# Patient Record
Sex: FEMALE | Race: WHITE | NOT HISPANIC OR LATINO
[De-identification: names, ages, dates, MRNs, and addresses within clinical notes are randomized per-mention and may not be internally consistent; named-entity substitution may affect disease eponyms.]

---

## 2017-06-14 ENCOUNTER — APPOINTMENT (OUTPATIENT)
Dept: DERMATOLOGY | Facility: CLINIC | Age: 77
End: 2017-06-14

## 2017-11-29 ENCOUNTER — APPOINTMENT (OUTPATIENT)
Dept: DERMATOLOGY | Facility: CLINIC | Age: 77
End: 2017-11-29
Payer: MEDICARE

## 2017-11-29 PROCEDURE — 17003 DESTRUCT PREMALG LES 2-14: CPT

## 2017-11-29 PROCEDURE — 99214 OFFICE O/P EST MOD 30 MIN: CPT | Mod: 25

## 2017-11-29 PROCEDURE — 17000 DESTRUCT PREMALG LESION: CPT

## 2018-05-30 ENCOUNTER — APPOINTMENT (OUTPATIENT)
Dept: DERMATOLOGY | Facility: CLINIC | Age: 78
End: 2018-05-30
Payer: MEDICARE

## 2018-05-30 PROCEDURE — 99213 OFFICE O/P EST LOW 20 MIN: CPT

## 2018-11-30 ENCOUNTER — APPOINTMENT (OUTPATIENT)
Dept: DERMATOLOGY | Facility: CLINIC | Age: 78
End: 2018-11-30
Payer: MEDICARE

## 2018-11-30 PROCEDURE — 99214 OFFICE O/P EST MOD 30 MIN: CPT

## 2019-09-28 ENCOUNTER — APPOINTMENT (OUTPATIENT)
Dept: DERMATOLOGY | Facility: CLINIC | Age: 79
End: 2019-09-28
Payer: MEDICARE

## 2019-09-28 PROCEDURE — 99214 OFFICE O/P EST MOD 30 MIN: CPT

## 2019-10-12 ENCOUNTER — TRANSCRIPTION ENCOUNTER (OUTPATIENT)
Age: 79
End: 2019-10-12

## 2020-07-01 ENCOUNTER — APPOINTMENT (OUTPATIENT)
Dept: DERMATOLOGY | Facility: CLINIC | Age: 80
End: 2020-07-01
Payer: MEDICARE

## 2020-07-01 PROCEDURE — 99214 OFFICE O/P EST MOD 30 MIN: CPT | Mod: 25

## 2020-07-01 PROCEDURE — 17000 DESTRUCT PREMALG LESION: CPT

## 2020-07-01 PROCEDURE — 17003 DESTRUCT PREMALG LES 2-14: CPT

## 2020-11-27 ENCOUNTER — APPOINTMENT (OUTPATIENT)
Dept: DERMATOLOGY | Facility: CLINIC | Age: 80
End: 2020-11-27

## 2020-12-22 ENCOUNTER — RESULT REVIEW (OUTPATIENT)
Age: 80
End: 2020-12-22

## 2021-04-16 ENCOUNTER — APPOINTMENT (OUTPATIENT)
Dept: ORTHOPEDIC SURGERY | Facility: CLINIC | Age: 81
End: 2021-04-16
Payer: MEDICARE

## 2021-04-16 VITALS
HEART RATE: 71 BPM | DIASTOLIC BLOOD PRESSURE: 78 MMHG | SYSTOLIC BLOOD PRESSURE: 161 MMHG | HEIGHT: 63 IN | BODY MASS INDEX: 29.23 KG/M2 | WEIGHT: 165 LBS

## 2021-04-16 DIAGNOSIS — Z85.3 PERSONAL HISTORY OF MALIGNANT NEOPLASM OF BREAST: ICD-10-CM

## 2021-04-16 DIAGNOSIS — Z78.9 OTHER SPECIFIED HEALTH STATUS: ICD-10-CM

## 2021-04-16 DIAGNOSIS — Z87.39 PERSONAL HISTORY OF OTHER DISEASES OF THE MUSCULOSKELETAL SYSTEM AND CONNECTIVE TISSUE: ICD-10-CM

## 2021-04-16 DIAGNOSIS — Z86.79 PERSONAL HISTORY OF OTHER DISEASES OF THE CIRCULATORY SYSTEM: ICD-10-CM

## 2021-04-16 DIAGNOSIS — M76.822 POSTERIOR TIBIAL TENDINITIS, LEFT LEG: ICD-10-CM

## 2021-04-16 DIAGNOSIS — Z86.39 PERSONAL HISTORY OF OTHER ENDOCRINE, NUTRITIONAL AND METABOLIC DISEASE: ICD-10-CM

## 2021-04-16 DIAGNOSIS — M25.561 PAIN IN RIGHT KNEE: ICD-10-CM

## 2021-04-16 DIAGNOSIS — Z80.3 FAMILY HISTORY OF MALIGNANT NEOPLASM OF BREAST: ICD-10-CM

## 2021-04-16 PROCEDURE — 73564 X-RAY EXAM KNEE 4 OR MORE: CPT | Mod: RT

## 2021-04-16 PROCEDURE — 99204 OFFICE O/P NEW MOD 45 MIN: CPT | Mod: 25

## 2021-04-16 PROCEDURE — 20610 DRAIN/INJ JOINT/BURSA W/O US: CPT | Mod: RT

## 2021-04-16 RX ORDER — ATORVASTATIN CALCIUM 10 MG/1
10 TABLET, FILM COATED ORAL
Refills: 0 | Status: ACTIVE | COMMUNITY

## 2021-04-16 RX ORDER — UBIDECARENONE 200 MG
CAPSULE ORAL
Refills: 0 | Status: ACTIVE | COMMUNITY

## 2021-04-16 RX ORDER — AZELASTINE HYDROCHLORIDE 137 UG/1
0.1 SPRAY, METERED NASAL
Refills: 0 | Status: ACTIVE | COMMUNITY

## 2021-04-16 RX ORDER — AMLODIPINE BESYLATE 5 MG/1
5 TABLET ORAL
Refills: 0 | Status: ACTIVE | COMMUNITY

## 2021-04-16 RX ORDER — ANASTROZOLE 1 MG/1
1 TABLET ORAL
Refills: 0 | Status: ACTIVE | COMMUNITY

## 2021-04-16 RX ORDER — OLMESARTAN MEDOXOMIL, AMLODIPINE BESYLATE AND HYDROCHLOROTHIAZIDE 40; 10; 12.5 MG/1; MG/1; MG/1
40-5-25 TABLET, FILM COATED ORAL
Refills: 0 | Status: ACTIVE | COMMUNITY

## 2021-04-16 RX ORDER — METOPROLOL TARTRATE 100 MG/1
100 TABLET, FILM COATED ORAL
Refills: 0 | Status: ACTIVE | COMMUNITY

## 2021-04-16 RX ORDER — ASPIRIN ENTERIC COATED TABLETS 81 MG 81 MG/1
81 TABLET, DELAYED RELEASE ORAL
Refills: 0 | Status: ACTIVE | COMMUNITY

## 2021-04-16 RX ORDER — MELOXICAM 15 MG/1
15 TABLET ORAL
Refills: 0 | Status: ACTIVE | COMMUNITY

## 2021-04-16 NOTE — ADDENDUM
[FreeTextEntry1] : This note was authored by Sanchez Skelton working as a medical scribe for Dr. Navin Ramos. The note was reviewed, edited, and revised by Dr. Navin Ramos whom is in agreement with the exam findings, imaging findings, and treatment plan. 04/16/2021.

## 2021-04-16 NOTE — HISTORY OF PRESENT ILLNESS
[de-identified] : Patient is an 80-year-old female presenting for evaluation of longstanding history of right knee pain.  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 Fragoso.  Patient's right knee pain is generalized to the entire knee and is worse with weightbearing today including walking long distance, rising from seated position, and standing for any amount of time.  She feels that the knee carlos from time to time as well.  Patient also admits to intermittent swelling of the right knee.  She has been diagnosed with right knee osteoarthritis and treated conservatively thus far.  She received a steroid injection approximately 1 year ago at that time. She has tried PT and home exercise without significant relief.  Patient takes Tylenol for pain with minimal relief.  She is fearful of another surgery and presents today hopeful for repeat injections.

## 2021-04-16 NOTE — PROCEDURE
[de-identified] : Using sterile technique, 2cc of depomedrol 40mg/ml, 4cc of 1% plain lidocaine, and 2 cc 0.25% marcaine was drawn up into a sterile syringe. The right knee was then sterilely prepped with chlorhexidine. Ethyl chloride spray was used to anesthetize the skin and subQ tissue. The depomedrol/lidocaine/marcaine mixture was then injected into the knee joint in the anterolateral position. The patient tolerated the procedure well without difficulty. The patient was given instructions on the use of ice and anti-inflammatories post injection site soreness.\par

## 2021-04-16 NOTE — DISCUSSION/SUMMARY
[de-identified] : The patient is a 80 year old female with bone on bone valgus arthritis of the right knee.  Because of a less than optimal outcome of her left knee many years ago, she is hesitant to undergo right knee replacement.  However she has significant deformity with a valgus thrust and hyperextension when walking which is making it very difficult for her to ambulate.  I am concerned that if she postpones this much longer that she may end up with minimal ambulatory capacity.  She is currently healthy enough to undergo surgery and we discussed the option of surgery in great detail.  I do not think injections are sustainable option for her.  She was given a cortisone injection in the right knee today upon her request.  He is going to consider knee replacement and discuss it further with her  and follow-up with us in 6 weeks.\par \par A discussion was had with the patient regarding a right total knee replacement. A long discussion was had with the patient as what the total joint replacement would entail. A model was used to demonstrate the operation and to discuss bearing surfaces of the implants. The hospitalization and rehabilitation were discussed.  The use of perioperative antibiotics and DVT prophylaxis were discussed. The risks, benefits and alternatives to surgical intervention were discussed at length with the patient. Specific risks discussed included: infection, wound breakdown, numbness and damage to nerves, tendon, muscle, arteries or other blood vessels. The possibility of recurrent pain, no improvement in pain and actual worsening of the pain were also mentioned in conversation with the patient. Medical complications related to the patient's general medical health including deep vein thrombosis, pulmonary embolus, heart attack, stroke, death and other complications from anesthesia were discussed as well. The patient was told that we will take steps to minimize these risks by using sterile technique, antibiotics and DVT prophylaxis when appropriate and following the patient postoperatively in the clinic setting to monitor progress. The benefits of surgery were discussed with the patient including the potential to improve the current clinical condition through operative intervention. Alternatives to surgical intervention include continued conservative management which may yield less than optimal results in this particular patient. All questions were answered to the satisfaction of the patient.

## 2021-04-16 NOTE — PHYSICAL EXAM
[de-identified] : The patient appears well nourished  and in no apparent distress.  The patient is alert and oriented to person, place, and time.   Affect and mood appear normal. The head is normocephalic and atraumatic.  The eyes reveal normal sclera and extra ocular muscles are intact. The tongue is midline with no apparent lesions.  Skin shows normal turgor with no evidence of eczema or psoriasis.  No respiratory distress noted.  Sensation grossly intact.\par   [de-identified] : Exam of the right knee shows a small effusion, 5 degrees of hyperextension, 13 degrees of valgus which is partially correctable, 3-5 mm laxity of the MCL, flexion of 116 degrees measured with a goniometer. During gait the knee hyperextends with a valgus thrust. 5/5 motor strength bilaterally distally. Sensation intact distally.  [de-identified] : Xray- 4 views of the right knee shows bone on bone valgus arthritis of the right knee.

## 2021-04-16 NOTE — CONSULT LETTER
[Dear  ___] : Dear  [unfilled], [Consult Letter:] : I had the pleasure of evaluating your patient, [unfilled]. [Please see my note below.] : Please see my note below. [Consult Closing:] : Thank you very much for allowing me to participate in the care of this patient.  If you have any questions, please do not hesitate to contact me. [Sincerely,] : Sincerely, [FreeTextEntry2] : NANCY BROTHERS MD\par  [FreeTextEntry3] : Navin Ramos MD\par Chief of Joint Replacement\par [facility] \par [facility]\par \par

## 2021-05-19 ENCOUNTER — APPOINTMENT (OUTPATIENT)
Dept: DERMATOLOGY | Facility: CLINIC | Age: 81
End: 2021-05-19
Payer: MEDICARE

## 2021-05-19 PROCEDURE — 99213 OFFICE O/P EST LOW 20 MIN: CPT

## 2021-06-03 ENCOUNTER — APPOINTMENT (OUTPATIENT)
Dept: ORTHOPEDIC SURGERY | Facility: CLINIC | Age: 81
End: 2021-06-03
Payer: MEDICARE

## 2021-06-03 VITALS
BODY MASS INDEX: 28.17 KG/M2 | WEIGHT: 159 LBS | DIASTOLIC BLOOD PRESSURE: 80 MMHG | SYSTOLIC BLOOD PRESSURE: 174 MMHG | HEART RATE: 66 BPM | HEIGHT: 63 IN

## 2021-06-03 PROCEDURE — 99213 OFFICE O/P EST LOW 20 MIN: CPT

## 2021-06-03 NOTE — HISTORY OF PRESENT ILLNESS
[de-identified] : Patient is an 80-year-old female presenting for follow up evaluation of longstanding history of right knee pain.  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 Fragoso.  Patient's right knee pain is generalized to the entire knee and is worse with weightbearing today including walking long distance, rising from seated position, and standing for any amount of time.  She feels that the knee carlos from time to time as well.  Patient also admits to intermittent swelling of the right knee.  She has been diagnosed with right knee osteoarthritis and treated conservatively thus far.  She has tried PT and home exercise without significant relief.  Patient takes Tylenol for pain with minimal relief. Patient was seen with the same issues 6 weeks ago and received a depo medrol injection to the right knee. She notes significant improvement with this.  She is fearful of another surgery and presents today to discuss future plans.

## 2021-06-03 NOTE — PHYSICAL EXAM
[de-identified] : The patient appears well nourished  and in no apparent distress.  The patient is alert and oriented to person, place, and time.   Affect and mood appear normal. The head is normocephalic and atraumatic.  The eyes reveal normal sclera and extra ocular muscles are intact. The tongue is midline with no apparent lesions.  Skin shows normal turgor with no evidence of eczema or psoriasis.  No respiratory distress noted.  Sensation grossly intact.  [de-identified] : Exam of the right knee shows 5 degrees of varus which is partially correctable, 3 mm laxity of the LCL, 5 degrees of hyperextension, 125 degrees of flexion measured with a goniometer. 5/5 motor strength bilaterally distally. Sensation intact distally. There is no effusion. [de-identified] : Prior X-ray (4/16/21): 4 views of the right knee demonstrate bone on bone valgus arthritis.

## 2021-06-03 NOTE — DISCUSSION/SUMMARY
[de-identified] : The patient is an 80 year old female who presents with bone on bone arthritis of her right knee. I believe she will ultimately benefit from a knee replacement, however patient would like to continue nonoperative treatment. Prescription provided for physical therapy. We discussed other treatment options including hyaluronic acid injections. Patient will follow up to schedule injections.

## 2021-11-12 ENCOUNTER — APPOINTMENT (OUTPATIENT)
Dept: ORTHOPEDIC SURGERY | Facility: CLINIC | Age: 81
End: 2021-11-12
Payer: MEDICARE

## 2021-11-12 VITALS
SYSTOLIC BLOOD PRESSURE: 137 MMHG | DIASTOLIC BLOOD PRESSURE: 81 MMHG | HEART RATE: 65 BPM | BODY MASS INDEX: 28.17 KG/M2 | HEIGHT: 63 IN | WEIGHT: 159 LBS

## 2021-11-12 PROCEDURE — 99214 OFFICE O/P EST MOD 30 MIN: CPT | Mod: 25

## 2021-11-12 PROCEDURE — 20610 DRAIN/INJ JOINT/BURSA W/O US: CPT | Mod: RT

## 2021-11-12 PROCEDURE — 73564 X-RAY EXAM KNEE 4 OR MORE: CPT | Mod: RT

## 2021-11-12 NOTE — DISCUSSION/SUMMARY
[de-identified] : The patient is an 81 year old female who presents with bone on bone arthritis of her right knee. I believe she will ultimately benefit from a knee replacement, however patient would like to continue nonoperative treatment. Prescription provided for physical therapy. Patient received a depo Medrol injection to the right knee using sterile technique and tolerated well. She will ice and elevate when home. Follow up in 6-8 weeks.

## 2021-11-12 NOTE — HISTORY OF PRESENT ILLNESS
[de-identified] : Patient is an 80-year-old female presenting for follow up evaluation of longstanding history of right knee pain.  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 Fragoso.  Patient's right knee pain is generalized to the entire knee and is worse with weightbearing today including walking long distance, rising from seated position, and standing for any amount of time.  She feels that the knee carlos from time to time as well.  Patient also admits to intermittent swelling of the right knee.  She has been diagnosed with right knee osteoarthritis and treated conservatively thus far.  She has tried PT and home exercise without significant relief.  Patient takes Tylenol for pain with minimal relief. Patient received a depo medrol injection to the right knee in April of 2021 with good relief at the time. She now states that pain has returned and she is interested in further injections.

## 2021-11-12 NOTE — PROCEDURE
[de-identified] : Using sterile technique, 2cc of depomedrol 40mg/ml, 4cc of 1% plain lidocaine, and 2 cc 0.25% marcaine was drawn up into a sterile syringe. The right knee was then sterilely prepped with chlorhexidine. Ethyl chloride spray was used to anesthetize the skin and subQ tissue. The depomedrol/lidocaine/marcaine mixture was then injected into the knee joint in the anterolateral position. The patient tolerated the procedure well without difficulty. The patient was given instructions on the use of ice and anti-inflammatories post injection site soreness.

## 2021-11-12 NOTE — PHYSICAL EXAM
[de-identified] : The patient appears well nourished  and in no apparent distress.  The patient is alert and oriented to person, place, and time.   Affect and mood appear normal. The head is normocephalic and atraumatic.  The eyes reveal normal sclera and extra ocular muscles are intact. The tongue is midline with no apparent lesions.  Skin shows normal turgor with no evidence of eczema or psoriasis.  No respiratory distress noted.  Sensation grossly intact.  [de-identified] : Exam of the right knee shows 5 degrees of varus which is partially correctable, 3 mm laxity of the LCL, 5 degrees of hyperextension, 125 degrees of flexion measured with a goniometer. 5/5 motor strength bilaterally distally. Sensation intact distally. There is no effusion. [de-identified] : X-ray: 4 views of the right knee demonstrate bone on bone valgus arthritis.

## 2022-05-18 ENCOUNTER — APPOINTMENT (OUTPATIENT)
Dept: DERMATOLOGY | Facility: CLINIC | Age: 82
End: 2022-05-18
Payer: MEDICARE

## 2022-05-18 PROCEDURE — 99213 OFFICE O/P EST LOW 20 MIN: CPT | Mod: 25

## 2022-05-18 PROCEDURE — 17000 DESTRUCT PREMALG LESION: CPT

## 2022-05-18 PROCEDURE — 17003 DESTRUCT PREMALG LES 2-14: CPT

## 2022-05-26 ENCOUNTER — APPOINTMENT (OUTPATIENT)
Dept: ORTHOPEDIC SURGERY | Facility: CLINIC | Age: 82
End: 2022-05-26
Payer: MEDICARE

## 2022-05-26 VITALS
SYSTOLIC BLOOD PRESSURE: 208 MMHG | DIASTOLIC BLOOD PRESSURE: 94 MMHG | BODY MASS INDEX: 28.17 KG/M2 | WEIGHT: 159 LBS | HEIGHT: 63 IN | HEART RATE: 64 BPM

## 2022-05-26 DIAGNOSIS — M17.11 UNILATERAL PRIMARY OSTEOARTHRITIS, RIGHT KNEE: ICD-10-CM

## 2022-05-26 PROCEDURE — 73564 X-RAY EXAM KNEE 4 OR MORE: CPT | Mod: RT

## 2022-05-26 PROCEDURE — 99215 OFFICE O/P EST HI 40 MIN: CPT

## 2022-05-26 NOTE — DISCUSSION/SUMMARY
[de-identified] : GABRIEL FOWLER is a 81 year female who presents with bone on bone valgus arthritis of the right knee. Based upon the patients continued symptoms and failure to respond to conservative treatment I have recommended a right total knee replacement for the patient.  A discussion was had with the patient regarding a right total knee replacement. A long discussion was had with the patient as what the total joint replacement would entail. A model was used to demonstrate the operation and to discuss bearing surfaces of the implants. The hospitalization and rehabilitation were discussed.  The use of perioperative antibiotics and DVT prophylaxis were discussed. The risks, benefits and alternatives to surgical intervention were discussed at length with the patient. Specific risks discussed included: infection, wound breakdown, numbness and damage to nerves, tendon, muscle, arteries or other blood vessels. The possibility of recurrent pain, no improvement in pain and actual worsening of the pain were also mentioned in conversation with the patient. Medical complications related to the patient's general medical health including deep vein thrombosis, pulmonary embolus, heart attack, stroke, death and other complications from anesthesia were discussed as well. The patient was told that we will take steps to minimize these risks by using sterile technique, antibiotics and DVT prophylaxis when appropriate and following the patient postoperatively in the clinic setting to monitor progress. The benefits of surgery were discussed with the patient including the potential to improve the current clinical condition through operative intervention. Alternatives to surgical intervention include continued conservative management which may yield less than optimal results in this particular patient. All questions were answered to the satisfaction of the patient. Models were used as an educational tool. We did discuss implant choices and fixation, with shared decision making with the patient.		 \par \par We discussed that the knee replacement will be done with robotic assistance to enhance accuracy and dynamic joint balancing.

## 2022-05-26 NOTE — PHYSICAL EXAM
[de-identified] : The patient appears well nourished  and in no apparent distress.  The patient is alert and oriented to person, place, and time.   Affect and mood appear normal. The head is normocephalic and atraumatic.  The eyes reveal normal sclera and extra ocular muscles are intact. The tongue is midline with no apparent lesions.  Skin shows normal turgor with no evidence of eczema or psoriasis.  No respiratory distress noted.  Sensation grossly intact.		  [de-identified] : Exam of the right knee shows 10 degrees of valgus which is partially correctable, 3 mm laxity of the MCL, 0 to 125 degrees of flexion measured with a goniometer. \par \par   5/5 motor strength bilaterally distally. Sensation intact distally.		  [de-identified] : X-ray: 4 views of the right knee demonstrate bone on bone valgus arthritis.

## 2022-05-26 NOTE — ADDENDUM
[FreeTextEntry1] : This note was authored by Jad Gale working as a medical scribe for Dr. Navin Ramos. The note was reviewed, edited, and revised by Dr. Navin Ramos whom is in agreement with the exam findings, imaging findings, and treatment plan. 05/26/2022

## 2022-05-26 NOTE — HISTORY OF PRESENT ILLNESS
[de-identified] : Patient is an 80-year-old female presenting for follow up evaluation of longstanding history of right knee osteoarthritis. 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 Fragoso.  Patient's right knee pain is generalized to the entire knee and is worse with weightbearing today including walking long distance, rising from seated position, and standing for any amount of time.  She feels that the knee carlos from time to time as well.  Patient also admits to intermittent swelling of the right knee.  She has been diagnosed with right knee osteoarthritis and treated conservatively thus far.  She has tried PT and home exercise without significant relief.  Patient takes Tylenol for pain with minimal relief. Patient received a depo Medrol injection to the right knee in February of 2022 without improvement. She now states that pain has returned and she is interested in further injections.

## 2022-07-09 ENCOUNTER — APPOINTMENT (OUTPATIENT)
Dept: CT IMAGING | Facility: CLINIC | Age: 82
End: 2022-07-09

## 2022-07-09 ENCOUNTER — OUTPATIENT (OUTPATIENT)
Dept: OUTPATIENT SERVICES | Facility: HOSPITAL | Age: 82
LOS: 1 days | End: 2022-07-09

## 2022-07-09 DIAGNOSIS — M17.11 UNILATERAL PRIMARY OSTEOARTHRITIS, RIGHT KNEE: ICD-10-CM

## 2022-07-09 PROCEDURE — 73700 CT LOWER EXTREMITY W/O DYE: CPT | Mod: 26,RT

## 2022-07-14 ENCOUNTER — OUTPATIENT (OUTPATIENT)
Dept: OUTPATIENT SERVICES | Facility: HOSPITAL | Age: 82
LOS: 1 days | End: 2022-07-14
Payer: MEDICARE

## 2022-07-14 VITALS
WEIGHT: 172.4 LBS | OXYGEN SATURATION: 98 % | RESPIRATION RATE: 20 BRPM | TEMPERATURE: 97 F | DIASTOLIC BLOOD PRESSURE: 96 MMHG | HEART RATE: 80 BPM | SYSTOLIC BLOOD PRESSURE: 178 MMHG | HEIGHT: 62 IN

## 2022-07-14 DIAGNOSIS — Z98.890 OTHER SPECIFIED POSTPROCEDURAL STATES: Chronic | ICD-10-CM

## 2022-07-14 DIAGNOSIS — Z96.652 PRESENCE OF LEFT ARTIFICIAL KNEE JOINT: Chronic | ICD-10-CM

## 2022-07-14 DIAGNOSIS — Z29.9 ENCOUNTER FOR PROPHYLACTIC MEASURES, UNSPECIFIED: ICD-10-CM

## 2022-07-14 DIAGNOSIS — Z01.818 ENCOUNTER FOR OTHER PREPROCEDURAL EXAMINATION: ICD-10-CM

## 2022-07-14 DIAGNOSIS — M17.11 UNILATERAL PRIMARY OSTEOARTHRITIS, RIGHT KNEE: ICD-10-CM

## 2022-07-14 LAB
A1C WITH ESTIMATED AVERAGE GLUCOSE RESULT: 5.6 % — SIGNIFICANT CHANGE UP (ref 4–5.6)
ALBUMIN SERPL ELPH-MCNC: 4.1 G/DL — SIGNIFICANT CHANGE UP (ref 3.3–5.2)
ALP SERPL-CCNC: 59 U/L — SIGNIFICANT CHANGE UP (ref 40–120)
ALT FLD-CCNC: 18 U/L — SIGNIFICANT CHANGE UP
ANION GAP SERPL CALC-SCNC: 14 MMOL/L — SIGNIFICANT CHANGE UP (ref 5–17)
APTT BLD: 27.4 SEC — LOW (ref 27.5–35.5)
AST SERPL-CCNC: 26 U/L — SIGNIFICANT CHANGE UP
BASOPHILS # BLD AUTO: 0.04 K/UL — SIGNIFICANT CHANGE UP (ref 0–0.2)
BASOPHILS NFR BLD AUTO: 0.6 % — SIGNIFICANT CHANGE UP (ref 0–2)
BILIRUB SERPL-MCNC: 0.5 MG/DL — SIGNIFICANT CHANGE UP (ref 0.4–2)
BLD GP AB SCN SERPL QL: SIGNIFICANT CHANGE UP
BUN SERPL-MCNC: 27.8 MG/DL — HIGH (ref 8–20)
CALCIUM SERPL-MCNC: 9 MG/DL — SIGNIFICANT CHANGE UP (ref 8.6–10.2)
CHLORIDE SERPL-SCNC: 103 MMOL/L — SIGNIFICANT CHANGE UP (ref 98–107)
CO2 SERPL-SCNC: 26 MMOL/L — SIGNIFICANT CHANGE UP (ref 22–29)
CREAT SERPL-MCNC: 1.01 MG/DL — SIGNIFICANT CHANGE UP (ref 0.5–1.3)
EGFR: 56 ML/MIN/1.73M2 — LOW
EOSINOPHIL # BLD AUTO: 0.28 K/UL — SIGNIFICANT CHANGE UP (ref 0–0.5)
EOSINOPHIL NFR BLD AUTO: 4.5 % — SIGNIFICANT CHANGE UP (ref 0–6)
ESTIMATED AVERAGE GLUCOSE: 114 MG/DL — SIGNIFICANT CHANGE UP (ref 68–114)
GLUCOSE SERPL-MCNC: 92 MG/DL — SIGNIFICANT CHANGE UP (ref 70–99)
HCT VFR BLD CALC: 38.9 % — SIGNIFICANT CHANGE UP (ref 34.5–45)
HGB BLD-MCNC: 13.2 G/DL — SIGNIFICANT CHANGE UP (ref 11.5–15.5)
IMM GRANULOCYTES NFR BLD AUTO: 0.3 % — SIGNIFICANT CHANGE UP (ref 0–1.5)
INR BLD: 1.08 RATIO — SIGNIFICANT CHANGE UP (ref 0.88–1.16)
LYMPHOCYTES # BLD AUTO: 2.08 K/UL — SIGNIFICANT CHANGE UP (ref 1–3.3)
LYMPHOCYTES # BLD AUTO: 33.3 % — SIGNIFICANT CHANGE UP (ref 13–44)
MCHC RBC-ENTMCNC: 32.2 PG — SIGNIFICANT CHANGE UP (ref 27–34)
MCHC RBC-ENTMCNC: 33.9 GM/DL — SIGNIFICANT CHANGE UP (ref 32–36)
MCV RBC AUTO: 94.9 FL — SIGNIFICANT CHANGE UP (ref 80–100)
MONOCYTES # BLD AUTO: 0.85 K/UL — SIGNIFICANT CHANGE UP (ref 0–0.9)
MONOCYTES NFR BLD AUTO: 13.6 % — SIGNIFICANT CHANGE UP (ref 2–14)
MRSA PCR RESULT.: SIGNIFICANT CHANGE UP
NEUTROPHILS # BLD AUTO: 2.97 K/UL — SIGNIFICANT CHANGE UP (ref 1.8–7.4)
NEUTROPHILS NFR BLD AUTO: 47.7 % — SIGNIFICANT CHANGE UP (ref 43–77)
PLATELET # BLD AUTO: 188 K/UL — SIGNIFICANT CHANGE UP (ref 150–400)
POTASSIUM SERPL-MCNC: 4.5 MMOL/L — SIGNIFICANT CHANGE UP (ref 3.5–5.3)
POTASSIUM SERPL-SCNC: 4.5 MMOL/L — SIGNIFICANT CHANGE UP (ref 3.5–5.3)
PROT SERPL-MCNC: 6.9 G/DL — SIGNIFICANT CHANGE UP (ref 6.6–8.7)
PROTHROM AB SERPL-ACNC: 12.5 SEC — SIGNIFICANT CHANGE UP (ref 10.5–13.4)
RBC # BLD: 4.1 M/UL — SIGNIFICANT CHANGE UP (ref 3.8–5.2)
RBC # FLD: 12.7 % — SIGNIFICANT CHANGE UP (ref 10.3–14.5)
S AUREUS DNA NOSE QL NAA+PROBE: SIGNIFICANT CHANGE UP
SODIUM SERPL-SCNC: 142 MMOL/L — SIGNIFICANT CHANGE UP (ref 135–145)
WBC # BLD: 6.24 K/UL — SIGNIFICANT CHANGE UP (ref 3.8–10.5)
WBC # FLD AUTO: 6.24 K/UL — SIGNIFICANT CHANGE UP (ref 3.8–10.5)

## 2022-07-14 PROCEDURE — 93005 ELECTROCARDIOGRAM TRACING: CPT

## 2022-07-14 PROCEDURE — G0463: CPT

## 2022-07-14 PROCEDURE — 93010 ELECTROCARDIOGRAM REPORT: CPT

## 2022-07-14 NOTE — H&P PST ADULT - NEUROLOGICAL
details… normal/cranial nerves II-XII intact/sensation intact/responds to verbal commands/no spontaneous movement normal/cranial nerves II-XII intact/sensation intact/responds to verbal commands/cranial nerves intact/no spontaneous movement

## 2022-07-14 NOTE — H&P PST ADULT - PROBLEM SELECTOR PLAN 1
caprini score 9, high risk for dvt, SCD ordered, surgical team to assess for dvt prophylaxis caprini score 12, high risk for dvt, SCD ordered, surgical team to assess for dvt prophylaxis

## 2022-07-14 NOTE — H&P PST ADULT - ASSESSMENT
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 Preop assessment prior to right TKR w/Dr Ramos scheduled for 2022    Patient was given information on joint class, joint book provided, ERP protocol reviewed with patient, MSSA/MRSA swabbed in PST, results pending     OPIOID RISK TOOL    ARTEM EACH BOX THAT APPLIES AND ADD TOTALS AT THE END    FAMILY HISTORY OF SUBSTANCE ABUSE                 FEMALE         MALE                                                Alcohol                             [  ]1 pt          [  ]3pts                                               Illegal Durgs                     [  ]2 pts        [  ]3pts                                               Rx Drugs                           [  ]4 pts        [  ]4 pts    PERSONAL HISTORY OF SUBSTANCE ABUSE                                                                                          Alcohol                             [  ]3 pts       [  ]3 pts                                               Illegal Drugs                     [  ]4 pts        [  ]4 pts                                               Rx Drugs                           [  ]5 pts        [  ]5 pts    AGE BETWEEN 16-45 YEARS                                      [  ]1 pt         [  ]1 pt    HISTORY OF PREADOLESCENT   SEXUAL ABUSE                                                             [  ]3 pts        [  ]0pts    PSYCHOLOGICAL DISEASE                     ADD, OCD, Bipolar, Schizophrenia        [  ]2 pts         [  ]2 pts                      Depression                                               [  ]1 pt           [  ]1 pt           SCORING TOTAL   (add numbers and type here)              ( 0 )                                     A score of 3 or lower indicated LOW risk for future opioid abuse  A score of 4 to 7 indicated moderate risk for future opioid abuse  A score of 8 or higher indicates a high risk for opioid abuse    CAPRINI VTE 2.0 SCORE [CLOT updated 2019]    AGE RELATED RISK FACTORS                                                       MOBILITY RELATED FACTORS  [ ] Age 41-60 years                                            (1 Point)                    [ ] Bed rest                                                        (1 Point)  [ ] Age: 61-74 years                                           (2 Points)                  [ ] Plaster cast                                                   (2 Points)  [x ] Age= 75 years                                              (3 Points)                    [ ] Bed bound for more than 72 hours                 (2 Points)    DISEASE RELATED RISK FACTORS                                               GENDER SPECIFIC FACTORS  [ ] Edema in the lower extremities                       (1 Point)              [ ] Pregnancy                                                     (1 Point)  [ ] Varicose veins                                               (1 Point)                     [ ] Post-partum < 6 weeks                                   (1 Point)             [x ] BMI > 25 Kg/m2                                            (1 Point)                     [ ] Hormonal therapy  or oral contraception          (1 Point)                 [ ] Sepsis (in the previous month)                        (1 Point)               [ ] History of pregnancy complications                 (1 point)  [ ] Pneumonia or serious lung disease                                               [ ] Unexplained or recurrent                     (1 Point)           (in the previous month)                               (1 Point)  [ ] Abnormal pulmonary function test                     (1 Point)                 SURGERY RELATED RISK FACTORS  [ ] Acute myocardial infarction                              (1 Point)               [ ]  Section                                             (1 Point)  [ ] Congestive heart failure (in the previous month)  (1 Point)      [ ] Minor surgery                                                  (1 Point)   [ ] Inflammatory bowel disease                             (1 Point)               [ ] Arthroscopic surgery                                        (2 Points)  [ ] Central venous access                                      (2 Points)                [ ] General surgery lasting more than 45 minutes (2 points)  [ ] Malignancy- Present or previous                   (2 Points)                [x ] Elective arthroplasty                                         (5 points)    [ ] Stroke (in the previous month)                          (5 Points)                                                                                                                                                           HEMATOLOGY RELATED FACTORS                                                 TRAUMA RELATED RISK FACTORS  [ ] Prior episodes of VTE                                     (3 Points)                [ ] Fracture of the hip, pelvis, or leg                       (5 Points)  [ ] Positive family history for VTE                         (3 Points)             [ ] Acute spinal cord injury (in the previous month)  (5 Points)  [ ] Prothrombin 87205 A                                     (3 Points)               [ ] Paralysis  (less than 1 month)                             (5 Points)  [ ] Factor V Leiden                                             (3 Points)                  [ ] Multiple Trauma within 1 month                        (5 Points)  [ ] Lupus anticoagulants                                     (3 Points)                                                           [ ] Anticardiolipin antibodies                               (3 Points)                                                       [ ] High homocysteine in the blood                      (3 Points)                                             [ ] Other congenital or acquired thrombophilia      (3 Points)                                                [ ] Heparin induced thrombocytopenia                  (3 Points)                                     Total Score [     9     ] 83 yo F with 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 Preop assessment prior to right TKR w/Dr Ramos scheduled for 2022    Pt was educated on preop preparation with written and verbal instructions. Pt was informed to obtain clearances >3 days before surgery. Pt was educated on NSAIDs, multivitamins and herbals that increase the risk of bleeding and need to be stopped 7 days before procedure. Pt was educated on covid testing and covid prevention, i.e. social distancing, handwashing, mask wearing. Pt verbalized understanding of the above.     Patient was given information on joint class, joint book provided, ERP protocol reviewed with patient, MSSA/MRSA swabbed in PST, results pending     OPIOID RISK TOOL    ARTEM EACH BOX THAT APPLIES AND ADD TOTALS AT THE END    FAMILY HISTORY OF SUBSTANCE ABUSE                 FEMALE         MALE                                                Alcohol                             [  ]1 pt          [  ]3pts                                               Illegal Durgs                     [  ]2 pts        [  ]3pts                                               Rx Drugs                           [  ]4 pts        [  ]4 pts    PERSONAL HISTORY OF SUBSTANCE ABUSE                                                                                          Alcohol                             [  ]3 pts       [  ]3 pts                                               Illegal Drugs                     [  ]4 pts        [  ]4 pts                                               Rx Drugs                           [  ]5 pts        [  ]5 pts    AGE BETWEEN 16-45 YEARS                                      [  ]1 pt         [  ]1 pt    HISTORY OF PREADOLESCENT   SEXUAL ABUSE                                                             [  ]3 pts        [  ]0pts    PSYCHOLOGICAL DISEASE                     ADD, OCD, Bipolar, Schizophrenia        [  ]2 pts         [  ]2 pts                      Depression                                               [  ]1 pt           [  ]1 pt           SCORING TOTAL   (add numbers and type here)              ( 0 )                                     A score of 3 or lower indicated LOW risk for future opioid abuse  A score of 4 to 7 indicated moderate risk for future opioid abuse  A score of 8 or higher indicates a high risk for opioid abuse    CAPRINI VTE 2.0 SCORE [CLOT updated 2019]    AGE RELATED RISK FACTORS                                                       MOBILITY RELATED FACTORS  [ ] Age 41-60 years                                            (1 Point)                    [ ] Bed rest                                                        (1 Point)  [ ] Age: 61-74 years                                           (2 Points)                  [ ] Plaster cast                                                   (2 Points)  [x ] Age= 75 years                                              (3 Points)                    [ ] Bed bound for more than 72 hours                 (2 Points)    DISEASE RELATED RISK FACTORS                                               GENDER SPECIFIC FACTORS  [ x] Edema in the lower extremities                       (1 Point)              [ ] Pregnancy                                                     (1 Point)  [ ] Varicose veins                                               (1 Point)                     [ ] Post-partum < 6 weeks                                   (1 Point)             [x ] BMI > 25 Kg/m2                                            (1 Point)                     [ ] Hormonal therapy  or oral contraception          (1 Point)                 [ ] Sepsis (in the previous month)                        (1 Point)               [ ] History of pregnancy complications                 (1 point)  [ ] Pneumonia or serious lung disease                                               [ ] Unexplained or recurrent                     (1 Point)           (in the previous month)                               (1 Point)  [ ] Abnormal pulmonary function test                     (1 Point)                 SURGERY RELATED RISK FACTORS  [ ] Acute myocardial infarction                              (1 Point)               [ ]  Section                                             (1 Point)  [ ] Congestive heart failure (in the previous month)  (1 Point)      [ ] Minor surgery                                                  (1 Point)   [ ] Inflammatory bowel disease                             (1 Point)               [ ] Arthroscopic surgery                                        (2 Points)  [ ] Central venous access                                      (2 Points)                [ ] General surgery lasting more than 45 minutes (2 points)  [x ] Malignancy- Present or previous                   (2 Points)                [x ] Elective arthroplasty                                         (5 points)    [ ] Stroke (in the previous month)                          (5 Points)                                                                                                                                                           HEMATOLOGY RELATED FACTORS                                                 TRAUMA RELATED RISK FACTORS  [ ] Prior episodes of VTE                                     (3 Points)                [ ] Fracture of the hip, pelvis, or leg                       (5 Points)  [ ] Positive family history for VTE                         (3 Points)             [ ] Acute spinal cord injury (in the previous month)  (5 Points)  [ ] Prothrombin 19234 A                                     (3 Points)               [ ] Paralysis  (less than 1 month)                             (5 Points)  [ ] Factor V Leiden                                             (3 Points)                  [ ] Multiple Trauma within 1 month                        (5 Points)  [ ] Lupus anticoagulants                                     (3 Points)                                                           [ ] Anticardiolipin antibodies                               (3 Points)                                                       [ ] High homocysteine in the blood                      (3 Points)                                             [ ] Other congenital or acquired thrombophilia      (3 Points)                                                [ ] Heparin induced thrombocytopenia                  (3 Points)                                     Total Score [     12    ]

## 2022-07-14 NOTE — H&P PST ADULT - MUSCULOSKELETAL COMMENTS
walks with antalgic gait on right side walks with antalgic gait on right side, ambulates with a cane

## 2022-07-14 NOTE — H&P PST ADULT - NSICDXPASTMEDICALHX_GEN_ALL_CORE_FT
PAST MEDICAL HISTORY:  History of breast cancer     Hyperlipidemia     Hypertension     Unilateral primary osteoarthritis, right knee      PAST MEDICAL HISTORY:  Anxiety     History of breast cancer     Hyperlipidemia     Hypertension     Unilateral primary osteoarthritis, right knee

## 2022-07-14 NOTE — H&P PST ADULT - NSICDXPASTSURGICALHX_GEN_ALL_CORE_FT
PAST SURGICAL HISTORY:  H/O carpal tunnel repair left    History of left knee replacement 2009    History of lumpectomy of left breast 1/21/2021, followed by radiation therapy     PAST SURGICAL HISTORY:  H/O carpal tunnel repair right    History of left knee replacement 2009    History of lumpectomy of left breast 1/21/2021, followed by radiation therapy

## 2022-07-14 NOTE — H&P PST ADULT - NSICDXFAMILYHX_GEN_ALL_CORE_FT
FAMILY HISTORY:  Father  Still living? Unknown  FH: heart disease, Age at diagnosis: Age Unknown    Sibling  Still living? Unknown  FH: heart disease, Age at diagnosis: Age Unknown    Child  Still living? Unknown  FH: heart attack, Age at diagnosis: Age Unknown

## 2022-07-14 NOTE — H&P PST ADULT - HISTORY OF PRESENT ILLNESS
81yo F PMH of HTN, HLD, longstanding Hx of right knee osteoarthritis, presents with c/o worsening right knee pain. She is s/p left total knee replacement 13 years ago with Dr Fragoso. Patient's right knee pain is generalized to the entire knee and is worse with weightbearing today including walking long distance, rising from seated position, and standing for any amount of time. She feels that the knee carlos from time to time as well. Patient also admits to intermittent swelling of the right knee. She has been treated conservatively thus far. She has tried PT and home exercise without significant relief. She takes Tylenol for pain with minimal relief. Patient received a depo Medrol injection to the right knee in February of 2022 without improvement. Imaging: X-ray: 4 views of the right knee demonstrate bone on bone valgus arthritis. Preop assessment prior to right TKR w/Dr Ramos scheduled for 8/1/2022 81 yo F PMH of HTN, HLD, breast cancer s/p left breast lumpectomy and RT, longstanding Hx of right knee osteoarthritis, presents with c/o worsening right knee pain. She is s/p left total knee replacement in 2009. Patient's right knee pain is generalized to the entire knee and is worse with weightbearing including walking long distance, rising from a seated position, and standing for any amount of time. She feels that the knee carlos from time to time. Patient has tried PT and home exercise without significant relief. She takes meloxicam for pain with minimal relief. Patient received steroid injections to the right knee without improvement. Imaging: X-ray: 4 views of the right knee demonstrate bone on bone valgus arthritis. Preop assessment prior to right TKR w/Dr Ramos scheduled for 8/1/2022

## 2022-07-14 NOTE — H&P PST ADULT - GASTROINTESTINAL
normal/soft/nontender/nondistended/normal active bowel sounds/no guarding/no masses palpable details…

## 2022-07-31 ENCOUNTER — TRANSCRIPTION ENCOUNTER (OUTPATIENT)
Age: 82
End: 2022-07-31

## 2022-08-01 ENCOUNTER — APPOINTMENT (OUTPATIENT)
Dept: ORTHOPEDIC SURGERY | Facility: HOSPITAL | Age: 82
End: 2022-08-01

## 2022-08-01 ENCOUNTER — RESULT REVIEW (OUTPATIENT)
Age: 82
End: 2022-08-01

## 2022-08-01 ENCOUNTER — TRANSCRIPTION ENCOUNTER (OUTPATIENT)
Age: 82
End: 2022-08-01

## 2022-08-01 ENCOUNTER — OUTPATIENT (OUTPATIENT)
Dept: OUTPATIENT SERVICES | Facility: HOSPITAL | Age: 82
LOS: 1 days | End: 2022-08-01
Payer: MEDICARE

## 2022-08-01 DIAGNOSIS — Z98.890 OTHER SPECIFIED POSTPROCEDURAL STATES: Chronic | ICD-10-CM

## 2022-08-01 DIAGNOSIS — Z96.652 PRESENCE OF LEFT ARTIFICIAL KNEE JOINT: Chronic | ICD-10-CM

## 2022-08-01 LAB — SARS-COV-2 N GENE NPH QL NAA+PROBE: NOT DETECTED

## 2022-08-01 DEVICE — CEMENT BONE PALACOS R W/O GENTAMICIN 1X40: Type: IMPLANTABLE DEVICE | Status: FUNCTIONAL

## 2022-08-01 DEVICE — SCREW PSN FEMALE 2.5X25MM: Type: IMPLANTABLE DEVICE | Status: FUNCTIONAL

## 2022-08-01 DEVICE — INSERT TIB BEARING TRIATHLON CS X3 SZ 3 9MM: Type: IMPLANTABLE DEVICE | Status: FUNCTIONAL

## 2022-08-01 DEVICE — IMP PATELLA SYMMETRIC X3 33X9MM: Type: IMPLANTABLE DEVICE | Status: FUNCTIONAL

## 2022-08-01 DEVICE — COMP FEM TRIATHLON CR SZ 4 RT: Type: IMPLANTABLE DEVICE | Status: FUNCTIONAL

## 2022-08-01 DEVICE — BASEPLATE TIB UNIV TRIATHLON SZ 3: Type: IMPLANTABLE DEVICE | Status: FUNCTIONAL

## 2022-08-01 DEVICE — PIN SELF DRILL TP STRL 4X140MM: Type: IMPLANTABLE DEVICE | Status: FUNCTIONAL

## 2022-08-01 DEVICE — PIN BONE SELF TAP 4X80MM STRL: Type: IMPLANTABLE DEVICE | Status: FUNCTIONAL

## 2022-08-01 RX ORDER — OLMESARTAN MEDOXOMIL-HYDROCHLOROTHIAZIDE 25; 40 MG/1; MG/1
1 TABLET, FILM COATED ORAL
Qty: 0 | Refills: 0 | DISCHARGE

## 2022-08-01 RX ORDER — UBIDECARENONE 100 MG
0 CAPSULE ORAL
Qty: 0 | Refills: 0 | DISCHARGE

## 2022-08-01 RX ORDER — FOLIC ACID/VIT B COMPLEX AND C 400 MCG
0 TABLET ORAL
Qty: 0 | Refills: 0 | DISCHARGE

## 2022-08-01 RX ORDER — AZELASTINE 137 UG/1
0 SPRAY, METERED NASAL
Qty: 0 | Refills: 0 | DISCHARGE

## 2022-08-01 RX ORDER — ANASTROZOLE 1 MG/1
1 TABLET ORAL
Qty: 0 | Refills: 0 | DISCHARGE

## 2022-08-01 RX ORDER — METOPROLOL TARTRATE 50 MG
1 TABLET ORAL
Qty: 0 | Refills: 0 | DISCHARGE

## 2022-08-02 ENCOUNTER — TRANSCRIPTION ENCOUNTER (OUTPATIENT)
Age: 82
End: 2022-08-02

## 2022-08-02 PROCEDURE — C1776: CPT

## 2022-08-02 PROCEDURE — 97116 GAIT TRAINING THERAPY: CPT

## 2022-08-02 PROCEDURE — 82962 GLUCOSE BLOOD TEST: CPT

## 2022-08-02 PROCEDURE — 80048 BASIC METABOLIC PNL TOTAL CA: CPT

## 2022-08-02 PROCEDURE — 97110 THERAPEUTIC EXERCISES: CPT

## 2022-08-02 PROCEDURE — C1713: CPT

## 2022-08-02 PROCEDURE — 97163 PT EVAL HIGH COMPLEX 45 MIN: CPT

## 2022-08-02 PROCEDURE — 85027 COMPLETE CBC AUTOMATED: CPT

## 2022-08-02 PROCEDURE — 0055T BONE SRGRY CMPTR CT/MRI IMAG: CPT | Mod: RT

## 2022-08-02 PROCEDURE — 27447 TOTAL KNEE ARTHROPLASTY: CPT | Mod: RT

## 2022-08-02 PROCEDURE — S2900: CPT

## 2022-08-02 PROCEDURE — 36415 COLL VENOUS BLD VENIPUNCTURE: CPT

## 2022-08-02 RX ORDER — MELOXICAM 15 MG/1
1 TABLET ORAL
Qty: 0 | Refills: 0 | DISCHARGE

## 2022-08-02 RX ORDER — ATORVASTATIN CALCIUM 80 MG/1
1 TABLET, FILM COATED ORAL
Qty: 0 | Refills: 0 | DISCHARGE

## 2022-08-03 ENCOUNTER — NON-APPOINTMENT (OUTPATIENT)
Age: 82
End: 2022-08-03

## 2022-08-04 DIAGNOSIS — M17.11 UNILATERAL PRIMARY OSTEOARTHRITIS, RIGHT KNEE: ICD-10-CM

## 2022-08-04 PROBLEM — E78.5 HYPERLIPIDEMIA, UNSPECIFIED: Chronic | Status: ACTIVE | Noted: 2022-07-14

## 2022-08-04 PROBLEM — I10 ESSENTIAL (PRIMARY) HYPERTENSION: Chronic | Status: ACTIVE | Noted: 2022-07-14

## 2022-08-04 PROBLEM — Z85.3 PERSONAL HISTORY OF MALIGNANT NEOPLASM OF BREAST: Chronic | Status: ACTIVE | Noted: 2022-07-14

## 2022-08-04 PROBLEM — F41.9 ANXIETY DISORDER, UNSPECIFIED: Chronic | Status: ACTIVE | Noted: 2022-07-14

## 2022-08-09 DIAGNOSIS — Z96.651 AFTERCARE FOLLOWING JOINT REPLACEMENT SURGERY: ICD-10-CM

## 2022-08-09 DIAGNOSIS — Z47.1 AFTERCARE FOLLOWING JOINT REPLACEMENT SURGERY: ICD-10-CM

## 2022-08-09 RX ORDER — OXYCODONE 5 MG/1
5 TABLET ORAL EVERY 6 HOURS
Qty: 28 | Refills: 0 | Status: ACTIVE | COMMUNITY
Start: 2022-08-09 | End: 1900-01-01

## 2022-08-16 RX ORDER — SENNOSIDES 8.6MG AND DOCUSATE SODIUM 50MG 8.6; 5 MG/1; MG/1
8.6-5 TABLET, FILM COATED ORAL
Qty: 60 | Refills: 0 | Status: ACTIVE | COMMUNITY
Start: 2022-08-16 | End: 1900-01-01

## 2022-08-25 ENCOUNTER — APPOINTMENT (OUTPATIENT)
Dept: ORTHOPEDIC SURGERY | Facility: CLINIC | Age: 82
End: 2022-08-25

## 2022-08-25 VITALS
HEIGHT: 63 IN | HEART RATE: 76 BPM | WEIGHT: 159 LBS | SYSTOLIC BLOOD PRESSURE: 174 MMHG | BODY MASS INDEX: 28.17 KG/M2 | DIASTOLIC BLOOD PRESSURE: 74 MMHG

## 2022-08-25 PROCEDURE — 99024 POSTOP FOLLOW-UP VISIT: CPT

## 2022-08-25 PROCEDURE — 73562 X-RAY EXAM OF KNEE 3: CPT | Mod: RT

## 2022-08-25 NOTE — HISTORY OF PRESENT ILLNESS
[___ Weeks Post Op] : [unfilled] weeks post op [de-identified] :  Right roboticassisted total knee replacement with SHALONDA. DOS 08/01/2022\par  [de-identified] : She  is doing well s/p right total knee replacement. She  completed in home physical therapy and is due to begin outpatient PT soon. She  is taking Oxycodone, Celebrex and Tylenol for pain and pain level is controlled. She  is taking aspirin for DVT ppx. Patient denies any fevers chills or constitutional symptoms. Patient denies any falls or Trauma. Overall patient is doing well.  [de-identified] : Exam of the right knee shows a well healing incision, 0 to 116 degrees of flexion measured with a goniometer. \par 5/5 motor strength bilaterally distally. Sensation intact distally.		  [de-identified] : X-ray: 3 views of the right knee demonstrate a total knee replacement in good alignment with a well centered patella, without evidence of loosening or subsidence, and no fracture.		  [de-identified] : The patient is doing very well 3 weeks after right TKR. She will continue to ambulate with a walker. Celebrex was renewed. The patient will be transitioned to outpatient physical therapy and a prescription was given for that.  The patient will continue aspirin 325 mg twice per day for DVT prophylaxis for the next 3 weeks. The importance of physical therapy and achieving full knee extension as well as progressing knee flexion was reinforced. Overall the patient is very happy with their outcome so far. Followup in 3 weeks with repeat x-rays

## 2022-08-25 NOTE — ADDENDUM
[FreeTextEntry1] : This note was authored by Jad Gale working as a medical scribe for Dr. Navin Ramos. The note was reviewed, edited, and revised by Dr. Navin Ramos whom is in agreement with the exam findings, imaging findings, and treatment plan. 08/25/2022

## 2022-09-16 ENCOUNTER — APPOINTMENT (OUTPATIENT)
Dept: ORTHOPEDIC SURGERY | Facility: CLINIC | Age: 82
End: 2022-09-16

## 2022-09-16 VITALS
WEIGHT: 159 LBS | HEART RATE: 72 BPM | SYSTOLIC BLOOD PRESSURE: 150 MMHG | DIASTOLIC BLOOD PRESSURE: 82 MMHG | BODY MASS INDEX: 28.17 KG/M2 | HEIGHT: 63 IN

## 2022-09-16 DIAGNOSIS — Z47.1 AFTERCARE FOLLOWING JOINT REPLACEMENT SURGERY: ICD-10-CM

## 2022-09-16 PROCEDURE — 73562 X-RAY EXAM OF KNEE 3: CPT | Mod: RT

## 2022-09-16 PROCEDURE — 99024 POSTOP FOLLOW-UP VISIT: CPT

## 2022-09-16 NOTE — HISTORY OF PRESENT ILLNESS
[de-identified] : S/P Right robotic_assisted total knee replacement with SHALONDA. DOS 08/01/2022\par  [de-identified] : GABRIEL FOWLER is a 82 year female 6 weeks s/p right TKR.  She  is ambulating and transferring well with a cane. She  reports continuing outpatient physical therapy with good improvement of strength. She continues aspirin for DVT prophylaxis. She  has returned to daily activities of life without significant pain or discomfort. Overall, she is very happy with the results of the surgery.		  [de-identified] : Exam of the right knee shows a well healed incision, 0 to 124 degrees of flexion measured with a goniometer. \par 5/5 motor strength bilaterally distally. Sensation intact distally.		  [de-identified] : X-ray: 3 views of the right knee demonstrate a total knee replacement in good alignment with a well centered patella, without evidence of loosening or subsidence, and no fracture.		  [de-identified] : The patient is doing very well 6 weeks following right total knee replacement. The patient will continue outpatient physical therapy and gradually progress toward home exercises. Importance of knee flexion and knee extension was reinforced to the patient again. The patient was advised to gradually taper down narcotic pain medication over the next 6 weeks. The patient may stop taking aspirin full-strength at this point and return to preoperative aspirin dosing if applicable. Overall the patient is making excellent progress and is very happy with their result so far. Follow up in 6 weeks for repeat evaluation.

## 2022-09-16 NOTE — ADDENDUM
[FreeTextEntry1] : This note was authored by Jad Gale working as a medical scribe for Dr. Navin Ramos. The note was reviewed, edited, and revised by Dr. Navin Ramos whom is in agreement with the exam findings, imaging findings, and treatment plan. 09/16/2022

## 2022-10-28 ENCOUNTER — APPOINTMENT (OUTPATIENT)
Dept: ORTHOPEDIC SURGERY | Facility: CLINIC | Age: 82
End: 2022-10-28

## 2022-10-28 VITALS
HEIGHT: 63 IN | HEART RATE: 73 BPM | WEIGHT: 159 LBS | BODY MASS INDEX: 28.17 KG/M2 | SYSTOLIC BLOOD PRESSURE: 158 MMHG | DIASTOLIC BLOOD PRESSURE: 81 MMHG

## 2022-10-28 DIAGNOSIS — Z96.659 PRESENCE OF UNSPECIFIED ARTIFICIAL KNEE JOINT: ICD-10-CM

## 2022-10-28 DIAGNOSIS — Z47.1 AFTERCARE FOLLOWING JOINT REPLACEMENT SURGERY: ICD-10-CM

## 2022-10-28 DIAGNOSIS — Z96.651 PRESENCE OF RIGHT ARTIFICIAL KNEE JOINT: ICD-10-CM

## 2022-10-28 PROCEDURE — 73562 X-RAY EXAM OF KNEE 3: CPT | Mod: RT

## 2022-10-28 PROCEDURE — 99024 POSTOP FOLLOW-UP VISIT: CPT

## 2022-10-28 NOTE — HISTORY OF PRESENT ILLNESS
[de-identified] : S/P Right robotic_assisted total knee replacement with SHALONDA. DOS 08/01/2022\par  [de-identified] : GABRIEL FOWLER is a 82 year female 3 months s/p right TKR.  She  is ambulating and transferring well with a cane. She  reports continuing outpatient physical therapy with good improvement of strength. She  has returned to daily activities of life without significant pain or discomfort. Overall, she is very happy with the results of the surgery.		  [de-identified] : Exam of the right knee shows a well healed incision, 0 to 120 degrees of flexion measured with a goniometer. There is no effusion. \par 5/5 motor strength bilaterally distally. Sensation intact distally.		  [de-identified] : X-ray: 3 views of the right knee demonstrate a total knee replacement in good alignment with a well centered patella, without evidence of loosening or subsidence, and no fracture.		  [de-identified] : The patient is doing very well 3 months following right total knee replacement. The patient will continue physical therapy and eventually progress to home exercises. Overall  she  is making excellent progress and is very happy with the result so far.  Dental prophylaxis was reviewed. Follow up one year post op for radiographic surveillance.

## 2022-10-28 NOTE — ADDENDUM
[FreeTextEntry1] : This note was authored by Jad Gale working as a medical scribe for Dr. Navin Ramos. The note was reviewed, edited, and revised by Dr. Navin Ramos whom is in agreement with the exam findings, imaging findings, and treatment plan. 10/28/2022

## 2022-12-02 ENCOUNTER — APPOINTMENT (OUTPATIENT)
Dept: PHYSICAL MEDICINE AND REHAB | Facility: CLINIC | Age: 82
End: 2022-12-02

## 2023-05-31 ENCOUNTER — APPOINTMENT (OUTPATIENT)
Dept: DERMATOLOGY | Facility: CLINIC | Age: 83
End: 2023-05-31
Payer: MEDICARE

## 2023-05-31 PROCEDURE — 99213 OFFICE O/P EST LOW 20 MIN: CPT

## 2023-07-13 ENCOUNTER — OFFICE (OUTPATIENT)
Dept: URBAN - METROPOLITAN AREA CLINIC 104 | Facility: CLINIC | Age: 83
Setting detail: OPHTHALMOLOGY
End: 2023-07-13
Payer: MEDICARE

## 2023-07-13 DIAGNOSIS — H04.561: ICD-10-CM

## 2023-07-13 DIAGNOSIS — D31.32: ICD-10-CM

## 2023-07-13 DIAGNOSIS — H01.005: ICD-10-CM

## 2023-07-13 DIAGNOSIS — H01.001: ICD-10-CM

## 2023-07-13 DIAGNOSIS — H01.004: ICD-10-CM

## 2023-07-13 DIAGNOSIS — H35.3121: ICD-10-CM

## 2023-07-13 DIAGNOSIS — H04.123: ICD-10-CM

## 2023-07-13 DIAGNOSIS — H25.13: ICD-10-CM

## 2023-07-13 DIAGNOSIS — H01.002: ICD-10-CM

## 2023-07-13 PROCEDURE — 92134 CPTRZ OPH DX IMG PST SGM RTA: CPT | Performed by: SPECIALIST

## 2023-07-13 PROCEDURE — 92014 COMPRE OPH EXAM EST PT 1/>: CPT | Performed by: SPECIALIST

## 2023-07-13 ASSESSMENT — KERATOMETRY
OS_K1POWER_DIOPTERS: 42.67
OS_K2POWER_DIOPTERS: 43.55
OD_K1POWER_DIOPTERS: 42.78
OS_AXISANGLE_DEGREES: 3
OD_AXISANGLE_DEGREES: 180
OD_K2POWER_DIOPTERS: 43.44

## 2023-07-13 ASSESSMENT — SPHEQUIV_DERIVED
OD_SPHEQUIV: 0.25
OS_SPHEQUIV: 0.75
OS_SPHEQUIV: 0.75
OD_SPHEQUIV: 0.625

## 2023-07-13 ASSESSMENT — REFRACTION_CURRENTRX
OD_SPHERE: +2.50
OD_AXIS: 83
OD_OVR_VA: 20/
OS_SPHERE: +2.50
OS_OVR_VA: 20/
OD_CYLINDER: -0.50
OS_SPHERE: +0.75
OD_OVR_VA: 20/
OD_SPHERE: +1.75
OS_OVR_VA: 20/

## 2023-07-13 ASSESSMENT — REFRACTION_MANIFEST
OS_SPHERE: +1.00
OS_CYLINDER: -0.50
OS_VA2: 20/20(J1+)
OD_VA1: 20/25+1
OS_ADD: +2.00
OD_CYLINDER: -0.75
OD_VA2: 20/20(J1+)
OD_AXIS: 90
OS_VA1: 20/25-2
OS_AXIS: 75
OD_ADD: +2.00
OD_SPHERE: +1.00

## 2023-07-13 ASSESSMENT — CONFRONTATIONAL VISUAL FIELD TEST (CVF)
OS_FINDINGS: FULL
OD_FINDINGS: FULL

## 2023-07-13 ASSESSMENT — VISUAL ACUITY
OS_BCVA: 20/30
OD_BCVA: 20/30-1

## 2023-07-13 ASSESSMENT — TONOMETRY
OS_IOP_MMHG: 17
OD_IOP_MMHG: 17

## 2023-07-13 ASSESSMENT — AXIALLENGTH_DERIVED
OD_AL: 23.6378
OD_AL: 23.4921
OS_AL: 23.4439
OS_AL: 23.4439

## 2023-07-13 ASSESSMENT — LID EXAM ASSESSMENTS
OD_BLEPHARITIS: RLL RUL 1+
OS_BLEPHARITIS: LLL LUL 1+

## 2023-07-13 ASSESSMENT — REFRACTION_AUTOREFRACTION
OD_SPHERE: +0.75
OS_CYLINDER: -2.50
OS_SPHERE: +2.00
OD_AXIS: 80
OD_CYLINDER: -1.00
OS_AXIS: 75

## 2023-07-13 ASSESSMENT — INCREASING TEAR LAKE - SEVERITY SCORE: OD_INC_TEARLAKE: 1+

## 2023-07-13 ASSESSMENT — TEAR BREAK UP TIME (TBUT)
OD_TBUT: T
OS_TBUT: T

## 2023-07-13 ASSESSMENT — LID POSITION - COMMENTS
OS_COMMENTS: MILD LID LAXITY
OD_COMMENTS: MILD LID LAXITY

## 2023-09-21 ASSESSMENT — KOOS JR
TWISING OR PIVOTING ON KNEE: MODERATE
IMPORTED KOOS JR SCORE: 16.0
GOING UP OR DOWN STAIRS: MODERATE
BENDING TO THE FLOOR TO PICK UP OBJECT: EXTREME
HOW SEVERE IS YOUR KNEE STIFFNESS AFTER FIRST WAKING IN MORNING: MODERATE
RISING FROM SITTING: SEVERE
IMPORTED FORM: YES
KOOS JR RAW SCORE: 16
STRAIGHTENING KNEE FULLY: MILD
IMPORTED LATERALITY: RIGHT
STANDING UPRIGHT: MODERATE

## 2024-06-03 ENCOUNTER — APPOINTMENT (OUTPATIENT)
Dept: DERMATOLOGY | Facility: CLINIC | Age: 84
End: 2024-06-03
Payer: MEDICARE

## 2024-06-03 PROCEDURE — 99213 OFFICE O/P EST LOW 20 MIN: CPT | Mod: 25

## 2024-06-03 PROCEDURE — 17000 DESTRUCT PREMALG LESION: CPT

## 2024-06-11 ENCOUNTER — NON-APPOINTMENT (OUTPATIENT)
Age: 84
End: 2024-06-11

## 2024-06-11 ASSESSMENT — KOOS JR
HOW SEVERE IS YOUR KNEE STIFFNESS AFTER FIRST WAKING IN MORNING: MILD
GOING UP OR DOWN STAIRS: MILD
KOOS JR RAW SCORE: 2

## 2024-07-31 ENCOUNTER — NON-APPOINTMENT (OUTPATIENT)
Age: 84
End: 2024-07-31

## 2024-10-24 ENCOUNTER — OFFICE (OUTPATIENT)
Dept: URBAN - METROPOLITAN AREA CLINIC 104 | Facility: CLINIC | Age: 84
Setting detail: OPHTHALMOLOGY
End: 2024-10-24
Payer: MEDICARE

## 2024-10-24 DIAGNOSIS — H04.123: ICD-10-CM

## 2024-10-24 DIAGNOSIS — H01.001: ICD-10-CM

## 2024-10-24 DIAGNOSIS — H35.3121: ICD-10-CM

## 2024-10-24 DIAGNOSIS — H01.004: ICD-10-CM

## 2024-10-24 DIAGNOSIS — H04.561: ICD-10-CM

## 2024-10-24 DIAGNOSIS — D31.32: ICD-10-CM

## 2024-10-24 DIAGNOSIS — H01.002: ICD-10-CM

## 2024-10-24 DIAGNOSIS — H25.13: ICD-10-CM

## 2024-10-24 DIAGNOSIS — H01.005: ICD-10-CM

## 2024-10-24 PROCEDURE — 92134 CPTRZ OPH DX IMG PST SGM RTA: CPT | Performed by: SPECIALIST

## 2024-10-24 PROCEDURE — 92014 COMPRE OPH EXAM EST PT 1/>: CPT | Performed by: SPECIALIST

## 2024-10-24 ASSESSMENT — REFRACTION_CURRENTRX
OD_SPHERE: +1.50
OS_SPHERE: +0.50
OS_AXIS: 171
OS_OVR_VA: 20/
OD_OVR_VA: 20/
OD_AXIS: 075
OD_CYLINDER: -0.50
OS_CYLINDER: -0.25

## 2024-10-24 ASSESSMENT — LID POSITION - COMMENTS
OS_COMMENTS: MILD LID LAXITY
OD_COMMENTS: MILD LID LAXITY

## 2024-10-24 ASSESSMENT — REFRACTION_AUTOREFRACTION
OS_SPHERE: +2.25
OS_CYLINDER: -3.00
OD_AXIS: 068
OS_AXIS: 075
OD_CYLINDER: -1.25
OD_SPHERE: +0.75

## 2024-10-24 ASSESSMENT — TEAR BREAK UP TIME (TBUT)
OD_TBUT: T
OS_TBUT: T

## 2024-10-24 ASSESSMENT — KERATOMETRY
OS_K2POWER_DIOPTERS: 43.66
OD_K2POWER_DIOPTERS: 43.44
OS_AXISANGLE_DEGREES: 004
OD_AXISANGLE_DEGREES: 175
OD_K1POWER_DIOPTERS: 42.51
OS_K1POWER_DIOPTERS: 42.51

## 2024-10-24 ASSESSMENT — REFRACTION_MANIFEST
OS_CYLINDER: -3.50
OS_VA1: 20/30
OD_AXIS: 75
OD_SPHERE: +1.50
OS_AXIS: 75
OD_CYLINDER: -1.25
OD_VA1: 20/30+2
OS_SPHERE: +2.50

## 2024-10-24 ASSESSMENT — VISUAL ACUITY
OD_BCVA: 20/40+2
OS_BCVA: 20/30-2

## 2024-10-24 ASSESSMENT — CONFRONTATIONAL VISUAL FIELD TEST (CVF)
OD_FINDINGS: FULL
OS_FINDINGS: FULL

## 2024-10-24 ASSESSMENT — LID EXAM ASSESSMENTS
OD_BLEPHARITIS: RLL RUL 1+
OS_BLEPHARITIS: LLL LUL 1+

## 2024-10-24 ASSESSMENT — INCREASING TEAR LAKE - SEVERITY SCORE: OD_INC_TEARLAKE: 1+

## 2024-10-24 ASSESSMENT — TONOMETRY
OS_IOP_MMHG: 15
OD_IOP_MMHG: 15

## 2024-12-25 PROBLEM — F10.90 ALCOHOL USE: Status: INACTIVE | Noted: 2021-04-16

## 2025-05-27 ENCOUNTER — OFFICE (OUTPATIENT)
Dept: URBAN - METROPOLITAN AREA CLINIC 104 | Facility: CLINIC | Age: 85
Setting detail: OPHTHALMOLOGY
End: 2025-05-27
Payer: MEDICARE

## 2025-05-27 ENCOUNTER — RX ONLY (RX ONLY)
Age: 85
End: 2025-05-27

## 2025-05-27 DIAGNOSIS — H25.13: ICD-10-CM

## 2025-05-27 DIAGNOSIS — H02.132: ICD-10-CM

## 2025-05-27 DIAGNOSIS — D31.32: ICD-10-CM

## 2025-05-27 DIAGNOSIS — H01.002: ICD-10-CM

## 2025-05-27 DIAGNOSIS — H35.3121: ICD-10-CM

## 2025-05-27 DIAGNOSIS — H02.131: ICD-10-CM

## 2025-05-27 DIAGNOSIS — H04.561: ICD-10-CM

## 2025-05-27 DIAGNOSIS — H01.004: ICD-10-CM

## 2025-05-27 DIAGNOSIS — H02.134: ICD-10-CM

## 2025-05-27 DIAGNOSIS — H01.005: ICD-10-CM

## 2025-05-27 DIAGNOSIS — H04.123: ICD-10-CM

## 2025-05-27 DIAGNOSIS — H01.001: ICD-10-CM

## 2025-05-27 PROBLEM — H02.135 ECTROPION-SENILE; RIGHT UPPER LID, RIGHT LOWER LID, LEFT UPPER LID, LEFT LOWER LID: Status: ACTIVE | Noted: 2025-05-27

## 2025-05-27 PROCEDURE — 92014 COMPRE OPH EXAM EST PT 1/>: CPT | Performed by: SPECIALIST

## 2025-05-27 PROCEDURE — 92134 CPTRZ OPH DX IMG PST SGM RTA: CPT | Performed by: SPECIALIST

## 2025-05-27 ASSESSMENT — REFRACTION_AUTOREFRACTION
OD_SPHERE: +1.25
OD_CYLINDER: -2.00
OS_SPHERE: +2.75
OS_CYLINDER: -2.75
OS_AXIS: 76
OD_AXIS: 75

## 2025-05-27 ASSESSMENT — LID EXAM ASSESSMENTS
OS_BLEPHARITIS: LLL LUL 1+
OD_BLEPHARITIS: RLL RUL 1+

## 2025-05-27 ASSESSMENT — REFRACTION_MANIFEST
OS_CYLINDER: -1.50
OS_AXIS: 90
OD_VA1: 20/40
OD_CYLINDER: -1.50
OD_AXIS: 75
OD_SPHERE: +1.25
OS_VA2: 20/20(J1+)
OS_VA1: 20/40
OS_ADD: +2.50
OD_ADD: +2.50
OD_VA2: 20/20(J1+)
OS_SPHERE: +1.50

## 2025-05-27 ASSESSMENT — KERATOMETRY
OD_AXISANGLE_DEGREES: 179
OS_K2POWER_DIOPTERS: 43.72
OD_K2POWER_DIOPTERS: 43.38
OS_AXISANGLE_DEGREES: 5
OD_K1POWER_DIOPTERS: 42.61
OS_K1POWER_DIOPTERS: 42.19

## 2025-05-27 ASSESSMENT — TONOMETRY
OS_IOP_MMHG: 15
OD_IOP_MMHG: 15

## 2025-05-27 ASSESSMENT — VISUAL ACUITY
OS_BCVA: 20/40
OD_BCVA: 20/40

## 2025-05-27 ASSESSMENT — LID POSITION - COMMENTS
OD_COMMENTS: MILD LID LAXITY
OS_COMMENTS: MILD LID LAXITY

## 2025-05-27 ASSESSMENT — TEAR BREAK UP TIME (TBUT)
OD_TBUT: T
OS_TBUT: T

## 2025-05-27 ASSESSMENT — INCREASING TEAR LAKE - SEVERITY SCORE: OD_INC_TEARLAKE: 1+

## 2025-05-27 ASSESSMENT — CONFRONTATIONAL VISUAL FIELD TEST (CVF)
OD_FINDINGS: FULL
OS_FINDINGS: FULL

## 2025-05-27 ASSESSMENT — LID POSITION - ECTROPION
OS_ECTROPION: LLL LUL 1+
OD_ECTROPION: RLL RUL 1+

## 2025-06-04 ENCOUNTER — APPOINTMENT (OUTPATIENT)
Dept: DERMATOLOGY | Facility: CLINIC | Age: 85
End: 2025-06-04
Payer: MEDICARE

## 2025-06-04 PROCEDURE — 11102 TANGNTL BX SKIN SINGLE LES: CPT

## 2025-06-04 PROCEDURE — 99213 OFFICE O/P EST LOW 20 MIN: CPT | Mod: 25

## 2025-08-01 ENCOUNTER — APPOINTMENT (OUTPATIENT)
Dept: PHYSICAL MEDICINE AND REHAB | Facility: CLINIC | Age: 85
End: 2025-08-01
Payer: MEDICARE

## 2025-08-01 DIAGNOSIS — R60.9 EDEMA, UNSPECIFIED: ICD-10-CM

## 2025-08-01 DIAGNOSIS — I89.0 LYMPHEDEMA, NOT ELSEWHERE CLASSIFIED: ICD-10-CM

## 2025-08-01 PROCEDURE — 99204 OFFICE O/P NEW MOD 45 MIN: CPT

## 2025-08-19 ENCOUNTER — APPOINTMENT (OUTPATIENT)
Dept: DERMATOLOGY | Facility: CLINIC | Age: 85
End: 2025-08-19
Payer: MEDICARE

## 2025-08-19 PROCEDURE — 99214 OFFICE O/P EST MOD 30 MIN: CPT

## 2025-09-19 ENCOUNTER — APPOINTMENT (OUTPATIENT)
Dept: PHYSICAL MEDICINE AND REHAB | Facility: CLINIC | Age: 85
End: 2025-09-19

## (undated) DEVICE — GLV 8 PROTEXIS

## (undated) DEVICE — POSITIONER LEG WRAP STERILE

## (undated) DEVICE — DRAPE HALF SHEET 40X57"

## (undated) DEVICE — FRAZIER SUCTION TIP 10FR

## (undated) DEVICE — DRAPE SPLIT SHEETS 77X108"

## (undated) DEVICE — MAKO STRYKER SILICONE RETRACTOR CORD

## (undated) DEVICE — SOL IRR POUR H2O 1000ML

## (undated) DEVICE — GLV 8 DURAPRENE

## (undated) DEVICE — BLADE SAFETY LOCK #15

## (undated) DEVICE — SYSTEM CEMENT MIXER COMPACT

## (undated) DEVICE — NDL HYPO SAFE 20G X 1.5"

## (undated) DEVICE — BLANKET WARMER UPPER ADULT

## (undated) DEVICE — SOL BETADINE POUCH 0.75OZ STERILE

## (undated) DEVICE — SUT DERMABOND 0.7ML

## (undated) DEVICE — MAKO VIZADISC KNEE TRACKING KIT

## (undated) DEVICE — DRAPE SHEET XL 77X98"

## (undated) DEVICE — SYR LUER LOK 30CC

## (undated) DEVICE — SUT VICRYL 2-0 27" CT-2 UNDYED

## (undated) DEVICE — SUT MONOCRYL 3-0 27" PS-2 UNDYED

## (undated) DEVICE — GLV COTTON LG STERILE

## (undated) DEVICE — SOL INJ NS 0.9% 100ML

## (undated) DEVICE — PACK TOTAL KNEE

## (undated) DEVICE — DRAPE 3/4 SHEET 52X76"

## (undated) DEVICE — NDL HYPO SAFE 22G X 1.5"

## (undated) DEVICE — MAKO DRAPE KIT

## (undated) DEVICE — DRSG IMMOBILIZER SHOULDER QUICK RELEASE LG

## (undated) DEVICE — NDL HYPO SAFE 18G X 1.5"

## (undated) DEVICE — WRAP COMPRESSION CALF MED

## (undated) DEVICE — SUT VICRYL 0 18" CT-1 UNDYED

## (undated) DEVICE — SOL IRR POUR NS 0.9% 1000ML

## (undated) DEVICE — HOOD FLYTE STRYKER SURGICOOL W PEELAWAY

## (undated) DEVICE — KT PULSAVAC WOUND W/ SPRAYTIP

## (undated) DEVICE — KIT CHECKPOINT FEM TIB STERILE

## (undated) DEVICE — SYR LUER LOK 10CC